# Patient Record
(demographics unavailable — no encounter records)

---

## 2025-05-28 NOTE — ASSESSMENT
[FreeTextEntry1] : COPD.  The patient initially when she was seen this morning was stable clinically with a clear chest x-ray and oxygen saturation 99% on room air.  Patient had a chest x-ray earlier in the month and was consistent with fluid overload.  Upon reviewing the chart the patient developed bronchospasm but did not desaturate.  Chest x-ray was ordered.  No bronchodilator was prescribed.  Will follow-up on the repeat chest x-ray and if unchanged and consistent with prominent suspicion marking and will trial diuresis instead of bronchodilator and observe otherwise will start bronchodilator for her COPD.  No evidence of aspiration at that time as indicated in the nurse's note

## 2025-05-28 NOTE — HISTORY OF PRESENT ILLNESS
[Former] : former [Never] : never [TextBox_4] : The patient does not complain of any shortness of breath wheezing coughing or bringing up any phlegm [ESS] : 0

## 2025-05-28 NOTE — PROCEDURE
[FreeTextEntry1] : Examination:Chest (1 View)      Diagnosis  :Cough, unspecified     ________________________________________________________________________________              SIGNIFICANT FINDINGS              Chest {1 View\} [CH1]:              Cardiomegaly noted. Patient is status post open heart              surgery. Increased interstitial markings noted bilaterally.              Trachea is midline position. Central vascular prominence              noted.              IMPRESSION:              Cardiomegaly noted central vascular prominence identified.              Bilateral interstitial infiltrates noted. The congestive              inflammatory changes were not present on previous study of              01/16/2025